# Patient Record
Sex: MALE | Race: OTHER | URBAN - METROPOLITAN AREA
[De-identification: names, ages, dates, MRNs, and addresses within clinical notes are randomized per-mention and may not be internally consistent; named-entity substitution may affect disease eponyms.]

---

## 2024-10-02 ENCOUNTER — APPOINTMENT (RX ONLY)
Dept: URBAN - METROPOLITAN AREA CLINIC 159 | Facility: CLINIC | Age: 69
Setting detail: DERMATOLOGY
End: 2024-10-02

## 2024-10-02 DIAGNOSIS — L81.4 OTHER MELANIN HYPERPIGMENTATION: ICD-10-CM

## 2024-10-02 DIAGNOSIS — L57.8 OTHER SKIN CHANGES DUE TO CHRONIC EXPOSURE TO NONIONIZING RADIATION: ICD-10-CM | Status: INADEQUATELY CONTROLLED

## 2024-10-02 DIAGNOSIS — Z71.89 OTHER SPECIFIED COUNSELING: ICD-10-CM

## 2024-10-02 DIAGNOSIS — L57.0 ACTINIC KERATOSIS: ICD-10-CM | Status: INADEQUATELY CONTROLLED

## 2024-10-02 DIAGNOSIS — Z85.828 PERSONAL HISTORY OF OTHER MALIGNANT NEOPLASM OF SKIN: ICD-10-CM

## 2024-10-02 DIAGNOSIS — D22 MELANOCYTIC NEVI: ICD-10-CM

## 2024-10-02 DIAGNOSIS — D18.0 HEMANGIOMA: ICD-10-CM

## 2024-10-02 DIAGNOSIS — L82.1 OTHER SEBORRHEIC KERATOSIS: ICD-10-CM

## 2024-10-02 PROBLEM — D18.01 HEMANGIOMA OF SKIN AND SUBCUTANEOUS TISSUE: Status: ACTIVE | Noted: 2024-10-02

## 2024-10-02 PROBLEM — D48.5 NEOPLASM OF UNCERTAIN BEHAVIOR OF SKIN: Status: ACTIVE | Noted: 2024-10-02

## 2024-10-02 PROBLEM — D22.9 MELANOCYTIC NEVI, UNSPECIFIED: Status: ACTIVE | Noted: 2024-10-02

## 2024-10-02 PROCEDURE — ? LIQUID NITROGEN

## 2024-10-02 PROCEDURE — 11102 TANGNTL BX SKIN SINGLE LES: CPT | Mod: 59

## 2024-10-02 PROCEDURE — 17000 DESTRUCT PREMALG LESION: CPT | Mod: 59

## 2024-10-02 PROCEDURE — ? SHAVE REMOVAL

## 2024-10-02 PROCEDURE — ? PRESCRIPTION

## 2024-10-02 PROCEDURE — ? BIOPSY BY SHAVE METHOD

## 2024-10-02 PROCEDURE — ? COUNSELING

## 2024-10-02 PROCEDURE — 99214 OFFICE O/P EST MOD 30 MIN: CPT | Mod: 25

## 2024-10-02 PROCEDURE — 11302 SHAVE SKIN LESION 1.1-2.0 CM: CPT

## 2024-10-02 PROCEDURE — ? PRESCRIPTION MEDICATION MANAGEMENT

## 2024-10-02 PROCEDURE — 17003 DESTRUCT PREMALG LES 2-14: CPT

## 2024-10-02 RX ORDER — FLUOROURACIL 5 MG/G
CREAM TOPICAL
Qty: 40 | Refills: 0 | Status: ERX | COMMUNITY
Start: 2024-10-02

## 2024-10-02 RX ADMIN — FLUOROURACIL: 5 CREAM TOPICAL at 00:00

## 2024-10-02 ASSESSMENT — LOCATION SIMPLE DESCRIPTION DERM
LOCATION SIMPLE: RIGHT UPPER BACK
LOCATION SIMPLE: LEFT SHOULDER
LOCATION SIMPLE: SCALP
LOCATION SIMPLE: CHEST
LOCATION SIMPLE: LEFT UPPER BACK

## 2024-10-02 ASSESSMENT — LOCATION DETAILED DESCRIPTION DERM
LOCATION DETAILED: UPPER STERNUM
LOCATION DETAILED: RIGHT MID-UPPER BACK
LOCATION DETAILED: RIGHT SUPERIOR LATERAL UPPER BACK
LOCATION DETAILED: LEFT POSTERIOR SHOULDER
LOCATION DETAILED: LEFT SUPERIOR UPPER BACK
LOCATION DETAILED: LEFT MEDIAL SUPERIOR CHEST
LOCATION DETAILED: LEFT SUPERIOR LATERAL UPPER BACK
LOCATION DETAILED: LEFT SUPERIOR PARIETAL SCALP

## 2024-10-02 ASSESSMENT — LOCATION ZONE DERM
LOCATION ZONE: SCALP
LOCATION ZONE: TRUNK
LOCATION ZONE: ARM

## 2024-10-02 NOTE — PROCEDURE: LIQUID NITROGEN
Show Aperture Variable?: Yes
Consent: The patient's consent was obtained including but not limited to risks of crusting, scabbing, blistering, scarring, darker or lighter pigmentary change, recurrence, incomplete removal and infection.
Duration Of Freeze Thaw-Cycle (Seconds): 0
Post-Care Instructions: I reviewed with the patient in detail post-care instructions. Patient is to wear sunprotection, and avoid picking at any of the treated lesions. Pt may apply Vaseline to crusted or scabbing areas.
Render Note In Bullet Format When Appropriate: No
Application Tool (Optional): Cry-AC
Detail Level: Detailed

## 2024-10-02 NOTE — PROCEDURE: SHAVE REMOVAL
Medical Necessity Information: It is in your best interest to select a reason for this procedure from the list below. All of these items fulfill various CMS LCD requirements except the new and changing color options.
Medical Necessity Clause: This procedure was medically necessary because the lesion that was treated was:
Lab: [id number]
Lab Facility: [id number]
Detail Level: Detailed
Was A Bandage Applied: Yes
Size Of Lesion In Cm (Required): 1.5
Depth Of Shave: dermis
Biopsy Method: Dermablade
Anesthesia Type: 1% lidocaine with epinephrine
Hemostasis: Drysol
Wound Care: Petrolatum
Render Path Notes In Note?: No
Consent was obtained from the patient. The risks and benefits to therapy were discussed in detail. Specifically, the risks of infection, scarring, bleeding, prolonged wound healing, incomplete removal, allergy to anesthesia, nerve injury and recurrence were addressed. Prior to the procedure, the treatment site was clearly identified and confirmed by the patient. All components of Universal Protocol/PAUSE Rule completed.
Post-Care Instructions: I reviewed with the patient in detail post-care instructions. Patient is to keep the biopsy site dry overnight, and then apply bacitracin twice daily until healed. Patient may apply hydrogen peroxide soaks to remove any crusting.
Notification Instructions: Patient will be notified of pathology results. However, patient instructed to call the office if not contacted within 2 weeks.
Billing Type: Third-Party Bill

## 2024-10-02 NOTE — PROCEDURE: PRESCRIPTION MEDICATION MANAGEMENT
Detail Level: Zone
Plan: Recheck scalp 3 months
Render In Strict Bullet Format?: No
Initiate Treatment: fluorouracil 5 % topical cream \\nQuantity: 40.0 g  Days Supply: 30\\nSig: Apply thin layer to the a.a on the  scalp 2x daily x 3weeks, then stop.\\n\\n*patient can spot treat

## 2024-10-02 NOTE — HPI: FULL BODY SKIN EXAMINATION
What Is The Reason For Today's Visit?: Full Body Skin Examination
What Is The Reason For Today's Visit? (Being Monitored For X): the re-examination of lesions previously examined
Additional History: C/O Scab in the crown scalp

## 2024-10-02 NOTE — PROCEDURE: COUNSELING
Detail Level: Generalized
Detail Level: Detailed
Sunscreen Recommendations: Eucerin Sensitive Skin Mineral SPF Qam
Detail Level: Simple